# Patient Record
Sex: FEMALE | Race: BLACK OR AFRICAN AMERICAN | NOT HISPANIC OR LATINO | URBAN - METROPOLITAN AREA
[De-identification: names, ages, dates, MRNs, and addresses within clinical notes are randomized per-mention and may not be internally consistent; named-entity substitution may affect disease eponyms.]

---

## 2024-09-24 ASSESSMENT — KOOS JR
GOING UP OR DOWN STAIRS: SEVERE
KOOS JR RAW SCORE: 18
STRAIGHTENING KNEE FULLY: SEVERE
BENDING TO THE FLOOR TO PICK UP OBJECT: MODERATE
HOW SEVERE IS YOUR KNEE STIFFNESS AFTER FIRST WAKING IN MORNING: MODERATE
TWISING OR PIVOTING ON KNEE: SEVERE
RISING FROM SITTING: MODERATE
STANDING UPRIGHT: SEVERE

## 2024-09-30 ENCOUNTER — OUTPATIENT (OUTPATIENT)
Dept: OUTPATIENT SERVICES | Facility: HOSPITAL | Age: 59
LOS: 1 days | End: 2024-09-30
Payer: COMMERCIAL

## 2024-09-30 VITALS
HEART RATE: 88 BPM | OXYGEN SATURATION: 99 % | HEIGHT: 65 IN | RESPIRATION RATE: 18 BRPM | DIASTOLIC BLOOD PRESSURE: 86 MMHG | TEMPERATURE: 99 F | SYSTOLIC BLOOD PRESSURE: 136 MMHG | WEIGHT: 240.08 LBS

## 2024-09-30 DIAGNOSIS — Z96.659 PRESENCE OF UNSPECIFIED ARTIFICIAL KNEE JOINT: Chronic | ICD-10-CM

## 2024-09-30 DIAGNOSIS — Z98.890 OTHER SPECIFIED POSTPROCEDURAL STATES: Chronic | ICD-10-CM

## 2024-09-30 DIAGNOSIS — M17.12 UNILATERAL PRIMARY OSTEOARTHRITIS, LEFT KNEE: ICD-10-CM

## 2024-09-30 DIAGNOSIS — Z96.651 PRESENCE OF RIGHT ARTIFICIAL KNEE JOINT: Chronic | ICD-10-CM

## 2024-09-30 DIAGNOSIS — Z01.818 ENCOUNTER FOR OTHER PREPROCEDURAL EXAMINATION: ICD-10-CM

## 2024-09-30 DIAGNOSIS — M06.9 RHEUMATOID ARTHRITIS, UNSPECIFIED: ICD-10-CM

## 2024-09-30 LAB
A1C WITH ESTIMATED AVERAGE GLUCOSE RESULT: 5.9 % — HIGH (ref 4–5.6)
ANION GAP SERPL CALC-SCNC: 11 MMOL/L — SIGNIFICANT CHANGE UP (ref 5–17)
BUN SERPL-MCNC: 11 MG/DL — SIGNIFICANT CHANGE UP (ref 7–23)
CALCIUM SERPL-MCNC: 9.8 MG/DL — SIGNIFICANT CHANGE UP (ref 8.4–10.5)
CHLORIDE SERPL-SCNC: 101 MMOL/L — SIGNIFICANT CHANGE UP (ref 96–108)
CO2 SERPL-SCNC: 28 MMOL/L — SIGNIFICANT CHANGE UP (ref 22–31)
CREAT SERPL-MCNC: 0.59 MG/DL — SIGNIFICANT CHANGE UP (ref 0.5–1.3)
EGFR: 105 ML/MIN/1.73M2 — SIGNIFICANT CHANGE UP
ESTIMATED AVERAGE GLUCOSE: 123 MG/DL — HIGH (ref 68–114)
GLUCOSE SERPL-MCNC: 91 MG/DL — SIGNIFICANT CHANGE UP (ref 70–99)
HCT VFR BLD CALC: 37.6 % — SIGNIFICANT CHANGE UP (ref 34.5–45)
HGB BLD-MCNC: 12 G/DL — SIGNIFICANT CHANGE UP (ref 11.5–15.5)
MCHC RBC-ENTMCNC: 24.8 PG — LOW (ref 27–34)
MCHC RBC-ENTMCNC: 31.9 GM/DL — LOW (ref 32–36)
MCV RBC AUTO: 77.7 FL — LOW (ref 80–100)
MRSA PCR RESULT.: SIGNIFICANT CHANGE UP
NRBC # BLD: 0 /100 WBCS — SIGNIFICANT CHANGE UP (ref 0–0)
PLATELET # BLD AUTO: 261 K/UL — SIGNIFICANT CHANGE UP (ref 150–400)
POTASSIUM SERPL-MCNC: 3.6 MMOL/L — SIGNIFICANT CHANGE UP (ref 3.5–5.3)
POTASSIUM SERPL-SCNC: 3.6 MMOL/L — SIGNIFICANT CHANGE UP (ref 3.5–5.3)
RBC # BLD: 4.84 M/UL — SIGNIFICANT CHANGE UP (ref 3.8–5.2)
RBC # FLD: 14.7 % — HIGH (ref 10.3–14.5)
S AUREUS DNA NOSE QL NAA+PROBE: SIGNIFICANT CHANGE UP
SODIUM SERPL-SCNC: 140 MMOL/L — SIGNIFICANT CHANGE UP (ref 135–145)
WBC # BLD: 5.79 K/UL — SIGNIFICANT CHANGE UP (ref 3.8–10.5)
WBC # FLD AUTO: 5.79 K/UL — SIGNIFICANT CHANGE UP (ref 3.8–10.5)

## 2024-09-30 PROCEDURE — 80048 BASIC METABOLIC PNL TOTAL CA: CPT

## 2024-09-30 PROCEDURE — 87641 MR-STAPH DNA AMP PROBE: CPT

## 2024-09-30 PROCEDURE — G0463: CPT

## 2024-09-30 PROCEDURE — 85027 COMPLETE CBC AUTOMATED: CPT

## 2024-09-30 PROCEDURE — 87640 STAPH A DNA AMP PROBE: CPT

## 2024-09-30 PROCEDURE — 83036 HEMOGLOBIN GLYCOSYLATED A1C: CPT

## 2024-09-30 RX ORDER — SODIUM CHLORIDE 0.9 % (FLUSH) 0.9 %
3 SYRINGE (ML) INJECTION EVERY 8 HOURS
Refills: 0 | Status: DISCONTINUED | OUTPATIENT
Start: 2024-10-15 | End: 2024-10-15

## 2024-09-30 RX ORDER — CEFAZOLIN SODIUM 1 G
2000 VIAL (EA) INJECTION ONCE
Refills: 0 | Status: COMPLETED | OUTPATIENT
Start: 2024-10-15 | End: 2024-10-15

## 2024-09-30 RX ORDER — LIDOCAINE HCL 20 MG/ML
0.2 AMPUL (ML) INJECTION ONCE
Refills: 0 | Status: DISCONTINUED | OUTPATIENT
Start: 2024-10-15 | End: 2024-10-15

## 2024-09-30 RX ORDER — TRAMADOL HYDROCHLORIDE 50 MG/1
50 TABLET, COATED ORAL ONCE
Refills: 0 | Status: DISCONTINUED | OUTPATIENT
Start: 2024-10-15 | End: 2024-10-15

## 2024-09-30 RX ORDER — PANTOPRAZOLE SODIUM 40 MG/1
40 TABLET, DELAYED RELEASE ORAL ONCE
Refills: 0 | Status: COMPLETED | OUTPATIENT
Start: 2024-10-15 | End: 2024-10-15

## 2024-09-30 RX ORDER — CHLORHEXIDINE GLUCONATE ORAL RINSE 1.2 MG/ML
1 SOLUTION DENTAL ONCE
Refills: 0 | Status: COMPLETED | OUTPATIENT
Start: 2024-10-15 | End: 2024-10-15

## 2024-09-30 NOTE — H&P PST ADULT - NSICDXPASTMEDICALHX_GEN_ALL_CORE_FT
PAST MEDICAL HISTORY:  BMI 40.0-44.9, adult     Hypertension     Interstitial lung disease     Obesity     Osteoarthritis     Rheumatoid arthritis     Unilateral primary osteoarthritis, left knee

## 2024-09-30 NOTE — H&P PST ADULT - NSICDXFAMILYHX_GEN_ALL_CORE_FT
FAMILY HISTORY:  Father  Still living? No  Family history of lung cancer, Age at diagnosis: Age Unknown    Mother  Still living? No  Family history of CKD (chronic kidney disease), Age at diagnosis: Age Unknown

## 2024-09-30 NOTE — H&P PST ADULT - NSICDXPASTSURGICALHX_GEN_ALL_CORE_FT
PAST SURGICAL HISTORY:  History of arthroscopy of left knee     History of colonoscopy     History of myomectomy     S/P knee replacement partial 6/2015    Status post right partial knee replacement

## 2024-09-30 NOTE — H&P PST ADULT - HISTORY OF PRESENT ILLNESS
58 y/o F PMH HTN, obesity (BMI 40), interstial lung dx (followed by Pulm, last PFT's in chart), osteoarthritis (S/P partial right knee replacement 6/2015 and revision in 2016 and left knee arthroscopy in ~2008) and RA dx in 2021 on [facility]. Now presents with complaints of left knee pain. She is localizing the pain predominantly in the posterior aspect of the left knee. She tried a left knee cortisone on 5/24/24 with only temporary relief, her pain has returned. She has mild relief with rest and Aleve and has also tried a gel injection in the past. She is tried a left knee injection with not much relief and noted of swelling of the knee afterwards. She is having progressively worsening knee pain and would like to consider surgical intervention.  X-rays of the left knee 3 views obtained today 09/23/2024 shows severe bone-on-bone osteoarthritis joint destruction osteophyte formation most significant of the medial compartment, varus deformity and she now presents to PST for a scheduled Left Total Knee Arthroplasty joana/ NIYAH on 10/15/2024. Denies recent fevers, chills, cough, chest pain or SOB and feels well overall.

## 2024-09-30 NOTE — H&P PST ADULT - MUSCULOSKELETAL
details… left knee/normal/ROM intact/decreased ROM due to pain/normal gait/strength 5/5 bilateral upper extremities

## 2024-09-30 NOTE — H&P PST ADULT - ASSESSMENT
CAPRINI SCORE    AGE RELATED RISK FACTORS                                                             [ ] Age 41-60 years                                            (1 Point)  [ ] Age: 61-74 years                                           (2 Points)                 [ ] Age= 75 years                                                (3 Points)             DISEASE RELATED RISK FACTORS                                                       [ ] Edema in the lower extremities                 (1 Point)                     [ ] Varicose veins                                               (1 Point)                                 [ ] BMI > 25 Kg/m2                                            (1 Point)                                  [ ] Serious infection (ie PNA)                            (1 Point)                     [ ] Lung disease ( COPD, Emphysema)            (1 Point)                                                                          [ ] Acute myocardial infarction                         (1 Point)                  [ ] Congestive heart failure (in the previous month)  (1 Point)         [ ] Inflammatory bowel disease                            (1 Point)                  [ ] Central venous access, PICC or Port               (2 points)       (within the last month)                                                                [ ] Stroke (in the previous month)                        (5 Points)    [ ] Previous or present malignancy                       (2 points)                                                                                                                                                         HEMATOLOGY RELATED FACTORS                                                         [ ] Prior episodes of VTE                                     (3 Points)                     [ ] Positive family history for VTE                      (3 Points)                  [ ] Prothrombin 49329 A                                     (3 Points)                     [ ] Factor V Leiden                                                (3 Points)                        [ ] Lupus anticoagulants                                      (3 Points)                                                           [ ] Anticardiolipin antibodies                              (3 Points)                                                       [ ] High homocysteine in the blood                   (3 Points)                                             [ ] Other congenital or acquired thrombophilia      (3 Points)                                                [ ] Heparin induced thrombocytopenia                  (3 Points)                                        MOBILITY RELATED FACTORS  [ ] Bed rest                                                         (1 Point)  [ ] Plaster cast                                                    (2 points)  [ ] Bed bound for more than 72 hours           (2 Points)    GENDER SPECIFIC FACTORS  [ ] Pregnancy or had a baby within the last month   (1 Point)  [ ] Post-partum < 6 weeks                                   (1 Point)  [ ] Hormonal therapy  or oral contraception   (1 Point)  [ ] History of pregnancy complications              (1 point)  [ ] Unexplained or recurrent              (1 Point)    OTHER RISK FACTORS                                           (1 Point)  [ ] BMI >40, smoking, diabetes requiring insulin, chemotherapy  blood transfusions and length of surgery over 2 hours    SURGERY RELATED RISK FACTORS  [ ]  Section within the last month     (1 Point)  [ ] Minor surgery                                                  (1 Point)  [ ] Arthroscopic surgery                                       (2 Points)  [ ] Planned major surgery lasting more            (2 Points)      than 45 minutes     [ ] Elective hip or knee joint replacement       (5 points)       surgery                                                TRAUMA RELATED RISK FACTORS  [ ] Fracture of the hip, pelvis, or leg                       (5 Points)  [ ] Spinal cord injury resulting in paralysis             (5 points)       (in the previous month)    [ ] Paralysis  (less than 1 month)                             (5 Points)  [ ] Multiple Trauma within 1 month                        (5 Points)    Total Score [  7  ]    Caprini Score 0-2: Low Risk, NO VTE prophylaxis required for most patients, encourage ambulation  Caprini Score 3-6: Moderate Risk , pharmacologic VTE prophylaxis is indicated for most patients (in the absence of contraindications)  Caprini Score Greater than or =7: High risk, pharmocologic VTE prophylaxis indicated for most patients (in the absence of contraindications)    Denies loose/cracked/removable teeth  Mallampati II

## 2024-09-30 NOTE — H&P PST ADULT - PROBLEM SELECTOR PLAN 2
Scheduled for sx on 10/15/2024. ERP, Surgical and chlorhexidine instructions reviewed and provided to pt. Pt has not been authorized for CT yet.

## 2024-10-03 PROBLEM — Z96.652 STATUS POST TOTAL LEFT KNEE REPLACEMENT: Status: ACTIVE | Noted: 2024-10-03

## 2024-10-09 ENCOUNTER — TRANSCRIPTION ENCOUNTER (OUTPATIENT)
Age: 59
End: 2024-10-09

## 2024-10-09 PROBLEM — M17.12 UNILATERAL PRIMARY OSTEOARTHRITIS, LEFT KNEE: Chronic | Status: ACTIVE | Noted: 2024-09-30

## 2024-10-09 PROBLEM — I10 ESSENTIAL (PRIMARY) HYPERTENSION: Chronic | Status: ACTIVE | Noted: 2024-09-30

## 2024-10-09 PROBLEM — J84.9 INTERSTITIAL PULMONARY DISEASE, UNSPECIFIED: Chronic | Status: ACTIVE | Noted: 2024-09-30

## 2024-10-09 PROBLEM — M06.9 RHEUMATOID ARTHRITIS, UNSPECIFIED: Chronic | Status: ACTIVE | Noted: 2024-09-30

## 2024-10-10 ENCOUNTER — RESULT REVIEW (OUTPATIENT)
Age: 59
End: 2024-10-10

## 2024-10-10 ENCOUNTER — OUTPATIENT (OUTPATIENT)
Dept: OUTPATIENT SERVICES | Facility: HOSPITAL | Age: 59
LOS: 1 days | End: 2024-10-10
Payer: SELF-PAY

## 2024-10-10 ENCOUNTER — APPOINTMENT (OUTPATIENT)
Dept: CT IMAGING | Facility: CLINIC | Age: 59
End: 2024-10-10
Payer: SELF-PAY

## 2024-10-10 DIAGNOSIS — Z96.659 PRESENCE OF UNSPECIFIED ARTIFICIAL KNEE JOINT: Chronic | ICD-10-CM

## 2024-10-10 DIAGNOSIS — Z98.890 OTHER SPECIFIED POSTPROCEDURAL STATES: Chronic | ICD-10-CM

## 2024-10-10 DIAGNOSIS — Z96.651 PRESENCE OF RIGHT ARTIFICIAL KNEE JOINT: Chronic | ICD-10-CM

## 2024-10-10 DIAGNOSIS — Z00.8 ENCOUNTER FOR OTHER GENERAL EXAMINATION: ICD-10-CM

## 2024-10-10 PROCEDURE — 73700 CT LOWER EXTREMITY W/O DYE: CPT

## 2024-10-10 PROCEDURE — 73700 CT LOWER EXTREMITY W/O DYE: CPT | Mod: 26,LT

## 2024-10-11 ENCOUNTER — TRANSCRIPTION ENCOUNTER (OUTPATIENT)
Age: 59
End: 2024-10-11

## 2024-10-15 ENCOUNTER — TRANSCRIPTION ENCOUNTER (OUTPATIENT)
Age: 59
End: 2024-10-15

## 2024-10-15 ENCOUNTER — APPOINTMENT (OUTPATIENT)
Dept: ORTHOPEDIC SURGERY | Facility: HOSPITAL | Age: 59
End: 2024-10-15

## 2024-10-15 ENCOUNTER — OUTPATIENT (OUTPATIENT)
Dept: INPATIENT UNIT | Facility: HOSPITAL | Age: 59
LOS: 1 days | End: 2024-10-15
Payer: COMMERCIAL

## 2024-10-15 VITALS
HEART RATE: 88 BPM | SYSTOLIC BLOOD PRESSURE: 135 MMHG | RESPIRATION RATE: 16 BRPM | TEMPERATURE: 98 F | DIASTOLIC BLOOD PRESSURE: 83 MMHG | HEIGHT: 65 IN | WEIGHT: 240.08 LBS | OXYGEN SATURATION: 97 %

## 2024-10-15 DIAGNOSIS — Z98.890 OTHER SPECIFIED POSTPROCEDURAL STATES: Chronic | ICD-10-CM

## 2024-10-15 DIAGNOSIS — Z96.659 PRESENCE OF UNSPECIFIED ARTIFICIAL KNEE JOINT: Chronic | ICD-10-CM

## 2024-10-15 DIAGNOSIS — Z96.651 PRESENCE OF RIGHT ARTIFICIAL KNEE JOINT: Chronic | ICD-10-CM

## 2024-10-15 DIAGNOSIS — M17.12 UNILATERAL PRIMARY OSTEOARTHRITIS, LEFT KNEE: ICD-10-CM

## 2024-10-15 LAB — GLUCOSE BLDC GLUCOMTR-MCNC: 95 MG/DL — SIGNIFICANT CHANGE UP (ref 70–99)

## 2024-10-15 PROCEDURE — 73560 X-RAY EXAM OF KNEE 1 OR 2: CPT | Mod: 26,LT

## 2024-10-15 DEVICE — COMP FEM CR CMNTLSS BEADED W/ PA SZ 2 LT: Type: IMPLANTABLE DEVICE | Site: LEFT | Status: FUNCTIONAL

## 2024-10-15 DEVICE — MAKO BONE PIN 3.2MM X 110MM: Type: IMPLANTABLE DEVICE | Site: LEFT | Status: FUNCTIONAL

## 2024-10-15 DEVICE — INSERT TIB BEARING CS X3 SZ 3 11MM: Type: IMPLANTABLE DEVICE | Site: LEFT | Status: FUNCTIONAL

## 2024-10-15 DEVICE — MAKO BONE PIN 3.2MM X 140MM: Type: IMPLANTABLE DEVICE | Site: LEFT | Status: FUNCTIONAL

## 2024-10-15 DEVICE — TRIATHLON TIBIAL COMP SZ 3: Type: IMPLANTABLE DEVICE | Site: LEFT | Status: FUNCTIONAL

## 2024-10-15 RX ORDER — SENNOSIDES 8.6 MG
2 TABLET ORAL AT BEDTIME
Refills: 0 | Status: DISCONTINUED | OUTPATIENT
Start: 2024-10-15 | End: 2024-10-29

## 2024-10-15 RX ORDER — CEFAZOLIN SODIUM 1 G
2000 VIAL (EA) INJECTION EVERY 8 HOURS
Refills: 0 | Status: COMPLETED | OUTPATIENT
Start: 2024-10-15 | End: 2024-10-16

## 2024-10-15 RX ORDER — CELECOXIB 200 MG/1
200 CAPSULE ORAL EVERY 12 HOURS
Refills: 0 | Status: DISCONTINUED | OUTPATIENT
Start: 2024-10-16 | End: 2024-10-29

## 2024-10-15 RX ORDER — FENTANYL CITRATE-0.9 % NACL/PF 300MCG/30
25 PATIENT CONTROLLED ANALGESIA VIAL INJECTION
Refills: 0 | Status: DISCONTINUED | OUTPATIENT
Start: 2024-10-15 | End: 2024-10-15

## 2024-10-15 RX ORDER — ONDANSETRON HCL/PF 4 MG/2 ML
4 VIAL (ML) INJECTION EVERY 6 HOURS
Refills: 0 | Status: DISCONTINUED | OUTPATIENT
Start: 2024-10-15 | End: 2024-10-29

## 2024-10-15 RX ORDER — FOLIC ACID 1 MG/1
1 TABLET ORAL DAILY
Refills: 0 | Status: DISCONTINUED | OUTPATIENT
Start: 2024-10-15 | End: 2024-10-29

## 2024-10-15 RX ORDER — OXYCODONE HYDROCHLORIDE 30 MG/1
5 TABLET, FILM COATED, EXTENDED RELEASE ORAL EVERY 4 HOURS
Refills: 0 | Status: DISCONTINUED | OUTPATIENT
Start: 2024-10-15 | End: 2024-10-16

## 2024-10-15 RX ORDER — ASPIRIN 325 MG
325 TABLET ORAL
Refills: 0 | Status: DISCONTINUED | OUTPATIENT
Start: 2024-10-15 | End: 2024-10-29

## 2024-10-15 RX ORDER — CETIRIZINE HYDROCHLORIDE 10 MG/1
10 TABLET ORAL DAILY
Refills: 0 | Status: DISCONTINUED | OUTPATIENT
Start: 2024-10-15 | End: 2024-10-29

## 2024-10-15 RX ORDER — PANTOPRAZOLE SODIUM 40 MG/1
40 TABLET, DELAYED RELEASE ORAL
Refills: 0 | Status: DISCONTINUED | OUTPATIENT
Start: 2024-10-15 | End: 2024-10-29

## 2024-10-15 RX ORDER — HYDROCHLOROTHIAZIDE 50 MG/1
1 TABLET ORAL
Refills: 0 | DISCHARGE

## 2024-10-15 RX ORDER — SODIUM CHLORIDE 0.9 % (FLUSH) 0.9 %
500 SYRINGE (ML) INJECTION ONCE
Refills: 0 | Status: COMPLETED | OUTPATIENT
Start: 2024-10-15 | End: 2024-10-15

## 2024-10-15 RX ORDER — ACETAMINOPHEN 325 MG
1000 TABLET ORAL ONCE
Refills: 0 | Status: DISCONTINUED | OUTPATIENT
Start: 2024-10-16 | End: 2024-10-29

## 2024-10-15 RX ORDER — SODIUM CHLORIDE 0.9 % (FLUSH) 0.9 %
500 SYRINGE (ML) INJECTION ONCE
Refills: 0 | Status: COMPLETED | OUTPATIENT
Start: 2024-10-15 | End: 2024-10-16

## 2024-10-15 RX ORDER — FOLIC ACID 1 MG/1
1 TABLET ORAL
Refills: 0 | DISCHARGE

## 2024-10-15 RX ORDER — ACETAMINOPHEN 325 MG
1000 TABLET ORAL ONCE
Refills: 0 | Status: COMPLETED | OUTPATIENT
Start: 2024-10-15 | End: 2024-10-15

## 2024-10-15 RX ORDER — OXYCODONE HYDROCHLORIDE 30 MG/1
10 TABLET, FILM COATED, EXTENDED RELEASE ORAL EVERY 4 HOURS
Refills: 0 | Status: DISCONTINUED | OUTPATIENT
Start: 2024-10-15 | End: 2024-10-15

## 2024-10-15 RX ORDER — ONDANSETRON HCL/PF 4 MG/2 ML
4 VIAL (ML) INJECTION ONCE
Refills: 0 | Status: DISCONTINUED | OUTPATIENT
Start: 2024-10-15 | End: 2024-10-15

## 2024-10-15 RX ORDER — KETOROLAC TROMETHAMINE 10 MG/1
15 TABLET, FILM COATED ORAL EVERY 6 HOURS
Refills: 0 | Status: COMPLETED | OUTPATIENT
Start: 2024-10-15 | End: 2024-10-16

## 2024-10-15 RX ORDER — ADALIMUMAB 40MG/0.8ML
40 KIT SUBCUTANEOUS
Refills: 0 | DISCHARGE

## 2024-10-15 RX ORDER — TRAMADOL HYDROCHLORIDE 50 MG/1
50 TABLET, COATED ORAL EVERY 6 HOURS
Refills: 0 | Status: DISCONTINUED | OUTPATIENT
Start: 2024-10-15 | End: 2024-10-15

## 2024-10-15 RX ORDER — ACETAMINOPHEN 325 MG
975 TABLET ORAL EVERY 8 HOURS
Refills: 0 | Status: DISCONTINUED | OUTPATIENT
Start: 2024-10-16 | End: 2024-10-29

## 2024-10-15 RX ORDER — MAGNESIUM HYDROXIDE 400 MG/5ML
30 SUSPENSION, ORAL (FINAL DOSE FORM) ORAL DAILY
Refills: 0 | Status: DISCONTINUED | OUTPATIENT
Start: 2024-10-15 | End: 2024-10-29

## 2024-10-15 RX ORDER — ACETAMINOPHEN 325 MG
1000 TABLET ORAL ONCE
Refills: 0 | Status: COMPLETED | OUTPATIENT
Start: 2024-10-16 | End: 2024-10-16

## 2024-10-15 RX ADMIN — OXYCODONE HYDROCHLORIDE 5 MILLIGRAM(S): 30 TABLET, FILM COATED, EXTENDED RELEASE ORAL at 19:21

## 2024-10-15 RX ADMIN — Medication 17 GRAM(S): at 20:21

## 2024-10-15 RX ADMIN — Medication 2 TABLET(S): at 20:21

## 2024-10-15 RX ADMIN — OXYCODONE HYDROCHLORIDE 5 MILLIGRAM(S): 30 TABLET, FILM COATED, EXTENDED RELEASE ORAL at 20:21

## 2024-10-15 RX ADMIN — PANTOPRAZOLE SODIUM 40 MILLIGRAM(S): 40 TABLET, DELAYED RELEASE ORAL at 12:16

## 2024-10-15 RX ADMIN — TRAMADOL HYDROCHLORIDE 50 MILLIGRAM(S): 50 TABLET, COATED ORAL at 12:17

## 2024-10-15 RX ADMIN — Medication 500 MILLILITER(S): at 16:00

## 2024-10-15 RX ADMIN — Medication 500 MILLILITER(S): at 19:21

## 2024-10-15 RX ADMIN — Medication 400 MILLIGRAM(S): at 22:07

## 2024-10-15 RX ADMIN — Medication 325 MILLIGRAM(S): at 18:07

## 2024-10-15 RX ADMIN — CHLORHEXIDINE GLUCONATE ORAL RINSE 1 APPLICATION(S): 1.2 SOLUTION DENTAL at 12:18

## 2024-10-15 RX ADMIN — KETOROLAC TROMETHAMINE 15 MILLIGRAM(S): 10 TABLET, FILM COATED ORAL at 21:00

## 2024-10-15 RX ADMIN — KETOROLAC TROMETHAMINE 15 MILLIGRAM(S): 10 TABLET, FILM COATED ORAL at 20:22

## 2024-10-15 RX ADMIN — Medication 100 MILLIGRAM(S): at 20:22

## 2024-10-15 RX ADMIN — Medication 1000 MILLIGRAM(S): at 23:07

## 2024-10-15 NOTE — PRE-ANESTHESIA EVALUATION ADULT - NSANTHPMHFT_GEN_ALL_CORE
58 y/o F PMH HTN, obesity (BMI 40), interstial lung dx (followed by Pulm, last PFT's in chart), osteoarthritis (S/P partial right knee replacement 6/2015 and revision in 2016 and left knee arthroscopy in ~2008) and RA dx in 2021 on [facility].

## 2024-10-15 NOTE — BRIEF OPERATIVE NOTE - NSICDXBRIEFPOSTOP_GEN_ALL_CORE_FT
POST-OP DIAGNOSIS:  Osteoarthritis of left knee 15-Oct-2024 15:22:12  Keri Pierson  Unilateral primary osteoarthritis, left knee 15-Oct-2024 15:22:21  Keri Pierson

## 2024-10-15 NOTE — OCCUPATIONAL THERAPY INITIAL EVALUATION ADULT - PERTINENT HX OF CURRENT PROBLEM, REHAB EVAL
58 y/o F PMH HTN, obesity (BMI 40), interstial lung dx (followed by Pulm, last PFT's in chart), osteoarthritis (S/P partial right knee replacement 6/2015 and revision in 2016 and left knee arthroscopy in ~2008) and RA dx in 2021 on [facility]. Now presents with complaints of left knee pain. She is localizing the pain predominantly in the posterior aspect of the left knee. She tried a left knee cortisone on 5/24/24 with only temporary relief, her pain has returned. She has mild relief with rest and Aleve and has also tried a gel injection in the past. She is tried a left knee injection with not much relief and noted of swelling of the knee afterwards. She is having progressively worsening knee pain and would like to consider surgical intervention.  X-rays of the left knee 3 views obtained today 09/23/2024 shows severe bone-on-bone osteoarthritis joint destruction osteophyte formation most significant of the medial compartment, varus deformity and she now s/p Left Total Knee Arthroplasty w/ NIYAH on 10/15/2024.

## 2024-10-15 NOTE — BRIEF OPERATIVE NOTE - NSICDXBRIEFPREOP_GEN_ALL_CORE_FT
PRE-OP DIAGNOSIS:  Unilateral primary osteoarthritis, left knee 15-Oct-2024 15:22:27  Keri Pierson  Osteoarthritis of left knee 15-Oct-2024 15:22:03  Keri Pierson

## 2024-10-15 NOTE — PHYSICAL THERAPY INITIAL EVALUATION ADULT - ADDITIONAL COMMENTS
Pt reports that she lives in a pvt house with her apt with her  and dtr with no steps to negotiate. Pt states that she was independent with all ADLs and mobility with no AD prior to admission. Pt owns a rolling walker and straight cane for DME. Pt owns a tub shower with grab bars.

## 2024-10-15 NOTE — DISCHARGE NOTE PROVIDER - HOSPITAL COURSE
Reason for Admission	Left total knee Arthroplasty  Goal of care "Better quality of life and to move around"      This is a 59 year old female admitted to [facility] on 10/15/24 for an elective total knee arthroplasty.  Surgery was uncomplicated.  Patient evaluated and treated by PT, recommended for home.  Remain of hospital stay unremarkable, and patient discharged home when PT cleared. History of Present Illness	  58 y/o F PMH HTN, obesity (BMI 40), intersitial lung dx (followed by Pulm, last PFT's in chart), osteoarthritis (S/P partial right knee replacement 6/2015 and revision in 2016 and left knee arthroscopy in ~2008) and RA dx in 2021 on [facility]. Now presents with complaints of left knee pain. She is localizing the pain predominantly in the posterior aspect of the left knee. She tried a left knee cortisone on 5/24/24 with only temporary relief, her pain has returned. She has mild relief with rest and Aleve and has also tried a gel injection in the past. She is tried a left knee injection with not much relief and noted of swelling of the knee afterwards. She is having progressively worsening knee pain and would like to consider surgical intervention.  X-rays of the left knee 3 views obtained today 09/23/2024 shows severe bone-on-bone osteoarthritis joint destruction osteophyte formation most significant of the medial compartment, varus deformity and she now presents to PST for a scheduled Left Total Knee Arthroplasty joana/ NIYAH on 10/15/2024. Denies recent fevers, chills, cough, chest pain or SOB and feels well overall.     Allergies:  	No Known Allergies:       PAST MEDICAL HISTORY:  -BMI 40.0-44.9, adult   -Hypertension   -Interstitial lung disease   -Obesity   -Osteoarthritis   -Rheumatoid arthritis   -Unilateral primary osteoarthritis, left knee.     PAST SURGICAL HISTORY:  -History of arthroscopy of left knee   -History of colonoscopy   -History of myomectomy   -S/P knee replacement partial 6/2015  -Status post right partial knee replacement.    10/15/24:  Patient presents to the hospital for elective surgery, underwent a left total knee replacement-  -tolerated procedure without complications.  Patient was evaluated by Physical therapy whom recommended home with home PT for discharge plan.  Patient is stable for discharge when cleared by PT.                 This is a 59 year old female admitted to [facility] on 10/15/24 for an elective total knee arthroplasty.    Surgery was uncomplicated.  Patient evaluated and treated by PT, recommended for home.    Remain of hospital stay unremarkable, and patient discharged home when PT cleared.

## 2024-10-15 NOTE — DISCHARGE NOTE PROVIDER - NSDCMRMEDTOKEN_GEN_ALL_CORE_FT
folic acid: 1 milligram(s) orally once a day  Humira 40 mg/0.8 mL subcutaneous kit: 40 milligram(s) subcutaneously every other week on Saturday  hydroCHLOROthiazide 25 mg oral tablet: 1 tab(s) orally once a day  methotrexate 10 mg oral tablet: 2 tab(s) orally once a week on Thursday  ZyrTEC 10 mg oral tablet: 1 tab(s) orally once a day as needed for  allergy symptoms   acetaminophen 325 mg oral tablet: 3 tab(s) orally every 8 hours Continue for 7 days, then as needed  aspirin 325 mg oral delayed release tablet: 1 tab(s) orally 2 times a day  folic acid: 1 milligram(s) orally once a day  Humira 40 mg/0.8 mL subcutaneous kit: 40 milligram(s) subcutaneously every other week on Saturday  hydroCHLOROthiazide 25 mg oral tablet: 1 tab(s) orally once a day  methotrexate 10 mg oral tablet: 2 tab(s) orally once a week on Thursday. Please hold until discussed with surgeon  naproxen 500 mg oral tablet: 1 tab(s) orally 2 times a day Continue for 14 days then as needed  Narcan 4 mg/0.1 mL nasal spray: 1 spray(s) intranasally every 2 minutes Alternate nostrils  oxyCODONE 5 mg oral tablet: 1 tab(s) orally every 4 hours as needed for Moderate Pain (4 - 6) Or 2 tabs orally every 4 hours as needed for severe pain (7-10)  polyethylene glycol 3350 oral powder for reconstitution: 17 gram(s) orally once a day (at bedtime)  senna leaf extract oral tablet: 2 tab(s) orally once a day (at bedtime)  traMADol 50 mg oral tablet: 1 tab(s) orally every 6 hours As needed Mild Pain (1 - 3)  ZyrTEC 10 mg oral tablet: 1 tab(s) orally once a day as needed for  allergy symptoms   acetaminophen 325 mg oral tablet: 3 tab(s) orally every 8 hours Continue for 7 days, then as needed  aspirin 325 mg oral delayed release tablet: 1 tab(s) orally 2 times a day MDD: 2  folic acid: 1 milligram(s) orally once a day  Humira 40 mg/0.8 mL subcutaneous kit: 40 milligram(s) subcutaneously every other week on Saturday  hydroCHLOROthiazide 25 mg oral tablet: 1 tab(s) orally once a day  methotrexate 10 mg oral tablet: 2 tab(s) orally once a week on Thursday. Please hold until discussed with surgeon  naproxen 500 mg oral tablet: 1 tab(s) orally 2 times a day Continue for 14 days then stop MDD: 2  Narcan 4 mg/0.1 mL nasal spray: 1 spray(s) intranasally every 2 minutes Alternate nostrils  oxyCODONE 5 mg oral tablet: 1 tab(s) orally every 4 hours as needed for Moderate Pain (4 - 6) Or 2 tabs orally every 4 hours as needed for severe pain (7-10) MDD: 6  pantoprazole 40 mg oral delayed release tablet: 1 tab(s) orally once a day (before a meal) MDD: 1  polyethylene glycol 3350 oral powder for reconstitution: 17 gram(s) orally once a day (at bedtime)  senna leaf extract oral tablet: 2 tab(s) orally once a day (at bedtime)  traMADol 50 mg oral tablet: 1 tab(s) orally every 6 hours as needed for Mild Pain (1 - 3) MDD: 4  ZyrTEC 10 mg oral tablet: 1 tab(s) orally once a day as needed for  allergy symptoms

## 2024-10-15 NOTE — CHART NOTE - NSCHARTNOTEFT_GEN_A_CORE
Resting without complaints.  No Chest Pain, SOB, N/V.    T(C): 36.3 (10-15-24 @ 15:30), Max: 36.8 (10-15-24 @ 09:56)  HR: 80 (10-15-24 @ 16:15) (80 - 93)  BP: 123/67 (10-15-24 @ 16:15) (117/71 - 135/83)  RR: 16 (10-15-24 @ 16:15) (15 - 16)  SpO2: 99% (10-15-24 @ 16:15) (97% - 100%)  Wt(kg): --    Exam:  Alert and [place], No Acute Distress  LLE dsg c/d/i with soft/compressible compartments  Calves soft, non-tender bilaterally  patient can move legs grossly but limited 2/2 spinal block  SILT to ankle  + DP pulse    Xray:-intact L knee hardware     AM labs        A/P: 59yFemale S/p  LTKA.  Exam limited 2/2 spinal block.  VSS. NAD  -PT/OT-WBAT  -Re-eval by ortho team when block d/c'd  -IS  -DVT PPx  -Pain Control  -Continue Current Tx  -AM labs    Grabiel Ventura PA-C  Team Pager #[phone]

## 2024-10-15 NOTE — PHYSICAL THERAPY INITIAL EVALUATION ADULT - PERTINENT HX OF CURRENT PROBLEM, REHAB EVAL
58 y/o F PMH HTN, obesity (BMI 40), interstial lung dx (followed by Pulm, last PFT's in chart), osteoarthritis (S/P partial right knee replacement 6/2015 and revision in 2016 and left knee arthroscopy in ~2008) and RA dx in 2021 on [facility]. Now presents with complaints of left knee pain. She is localizing the pain predominantly in the posterior aspect of the left knee. She tried a left knee cortisone on 5/24/24 with only temporary relief, her pain has returned. She has mild relief with rest and Aleve and has also tried a gel injection in the past. She is tried a left knee injection with not much relief and noted of swelling of the knee afterwards. She is having progressively worsening knee pain and would like to consider surgical intervention.  X-rays of the left knee 3 views obtained today 09/23/2024 shows severe bone-on-bone osteoarthritis joint destruction osteophyte formation most significant of the medial compartment, varus deformity and she now presents to PST for a scheduled Left Total Knee Arthroplasty joana/ NIYAH on 10/15/2024. Denies recent fevers, chills, cough, chest pain or SOB and feels well overall. 60 y/o F PMH HTN, obesity (BMI 40), interstitial lung dx (followed by Pulm, last PFT's in chart), osteoarthritis (S/P partial right knee replacement 6/2015 and revision in 2016 and left knee arthroscopy in ~2008) and RA dx in 2021 on [facility]. Now presents with complaints of left knee pain. She is localizing the pain predominantly in the posterior aspect of the left knee. She tried a left knee cortisone on 5/24/24 with only temporary relief, her pain has returned. She has mild relief with rest and Aleve and has also tried a gel injection in the past. She is tried a left knee injection with not much relief and noted of swelling of the knee afterwards. She is having progressively worsening knee pain and would like to consider surgical intervention.  X-rays of the left knee 3 views obtained today 09/23/2024 shows severe bone-on-bone osteoarthritis joint destruction osteophyte formation most significant of the medial compartment, varus deformity and she now presents to PST for a scheduled Left Total Knee Arthroplasty joana/ NIYAH on 10/15/2024. Denies recent fevers, chills, cough, chest pain or SOB and feels well overall.

## 2024-10-15 NOTE — DISCHARGE NOTE PROVIDER - NSDCFUSCHEDAPPT_GEN_ALL_CORE_FT
Lucio Jensen  [facility]  ORTHOSURG [address]  Scheduled Appointment: 10/15/2024

## 2024-10-15 NOTE — DISCHARGE NOTE PROVIDER - CARE PROVIDER_API CALL
Lucio Jensen  Orthopaedic Surgery  [address], [address]  [place] [address]  Phone: [phone]  Fax: [phone]  Follow Up Time:

## 2024-10-15 NOTE — PHYSICAL THERAPY INITIAL EVALUATION ADULT - LEVEL OF INDEPENDENCE: SIT/STAND, REHAB EVAL
contact guard
PAST SURGICAL HISTORY:  H/O ileostomy 4/2015    Parastomal hernia without obstruction or gangrene

## 2024-10-15 NOTE — PRE-ANESTHESIA EVALUATION ADULT - TEMPERATURE IN CELSIUS (DEGREES C)
Education provided on the pain management plan of care/Side effects of pain management treatment/Activities of daily living, including home environment that might     exacerbate pain or reduce effectiveness of the pain management plan of care as well as strategies to address these issues/Safe use, storage and disposal of opioids when prescribed 36.8

## 2024-10-15 NOTE — PATIENT PROFILE ADULT - FALL HARM RISK - UNIVERSAL INTERVENTIONS
Bed in lowest position, wheels locked, appropriate side rails in place/Call bell, personal items and telephone in reach/Instruct patient to call for assistance before getting out of bed or chair/Non-slip footwear when patient is out of bed/[place] to call system/Physically safe environment - no spills, clutter or unnecessary equipment/Purposeful Proactive Rounding/Room/bathroom lighting operational, light cord in reach

## 2024-10-15 NOTE — OCCUPATIONAL THERAPY INITIAL EVALUATION ADULT - ASSISTIVE DEVICE FOR TOILET TRANSFER, REHAB EVAL
Quality 111:Pneumonia Vaccination Status For Older Adults: Pneumococcal Vaccination Previously Received Quality 110: Preventive Care And Screening: Influenza Immunization: Influenza Immunization Administered during Influenza season Detail Level: Detailed Quality 226: Preventive Care And Screening: Tobacco Use: Screening And Cessation Intervention: Patient screened for tobacco use and is an ex/non-smoker rolling walker

## 2024-10-15 NOTE — BRIEF OPERATIVE NOTE - BRIEF OP NOTE DRAINS
[Time Spent: ___ minutes] : I have spent [unfilled] minutes of time on the encounter. [FreeTextEntry3] : \par I, NATALEE AGUILARU , am scribing for and in the presence of DALILA JAY the following sections: History of present illness, past Medical/family/surgical/family/social history, review of systems, vital signs, physical exam and disposition.\par \par I personally performed the services described in the documentation, reviewed the documentation recorded by the scribe in my presence and it accurately and completely records my words and actions.\par \par \par  None

## 2024-10-16 ENCOUNTER — TRANSCRIPTION ENCOUNTER (OUTPATIENT)
Age: 59
End: 2024-10-16

## 2024-10-16 VITALS
TEMPERATURE: 98 F | RESPIRATION RATE: 18 BRPM | HEART RATE: 95 BPM | DIASTOLIC BLOOD PRESSURE: 80 MMHG | SYSTOLIC BLOOD PRESSURE: 144 MMHG | OXYGEN SATURATION: 97 %

## 2024-10-16 LAB
ANION GAP SERPL CALC-SCNC: 13 MMOL/L — SIGNIFICANT CHANGE UP (ref 5–17)
BUN SERPL-MCNC: 9 MG/DL — SIGNIFICANT CHANGE UP (ref 7–23)
CALCIUM SERPL-MCNC: 9.1 MG/DL — SIGNIFICANT CHANGE UP (ref 8.4–10.5)
CHLORIDE SERPL-SCNC: 100 MMOL/L — SIGNIFICANT CHANGE UP (ref 96–108)
CO2 SERPL-SCNC: 24 MMOL/L — SIGNIFICANT CHANGE UP (ref 22–31)
CREAT SERPL-MCNC: 0.59 MG/DL — SIGNIFICANT CHANGE UP (ref 0.5–1.3)
EGFR: 105 ML/MIN/1.73M2 — SIGNIFICANT CHANGE UP
GLUCOSE SERPL-MCNC: 117 MG/DL — HIGH (ref 70–99)
HCT VFR BLD CALC: 34.1 % — LOW (ref 34.5–45)
HGB BLD-MCNC: 11.1 G/DL — LOW (ref 11.5–15.5)
MCHC RBC-ENTMCNC: 25.2 PG — LOW (ref 27–34)
MCHC RBC-ENTMCNC: 32.6 GM/DL — SIGNIFICANT CHANGE UP (ref 32–36)
MCV RBC AUTO: 77.3 FL — LOW (ref 80–100)
NRBC # BLD: 0 /100 WBCS — SIGNIFICANT CHANGE UP (ref 0–0)
PLATELET # BLD AUTO: 258 K/UL — SIGNIFICANT CHANGE UP (ref 150–400)
POTASSIUM SERPL-MCNC: 3.6 MMOL/L — SIGNIFICANT CHANGE UP (ref 3.5–5.3)
POTASSIUM SERPL-SCNC: 3.6 MMOL/L — SIGNIFICANT CHANGE UP (ref 3.5–5.3)
RBC # BLD: 4.41 M/UL — SIGNIFICANT CHANGE UP (ref 3.8–5.2)
RBC # FLD: 14.5 % — SIGNIFICANT CHANGE UP (ref 10.3–14.5)
SODIUM SERPL-SCNC: 137 MMOL/L — SIGNIFICANT CHANGE UP (ref 135–145)
WBC # BLD: 8.44 K/UL — SIGNIFICANT CHANGE UP (ref 3.8–10.5)
WBC # FLD AUTO: 8.44 K/UL — SIGNIFICANT CHANGE UP (ref 3.8–10.5)

## 2024-10-16 PROCEDURE — 97530 THERAPEUTIC ACTIVITIES: CPT

## 2024-10-16 PROCEDURE — C1713: CPT

## 2024-10-16 PROCEDURE — S2900: CPT

## 2024-10-16 PROCEDURE — 73560 X-RAY EXAM OF KNEE 1 OR 2: CPT

## 2024-10-16 PROCEDURE — 82962 GLUCOSE BLOOD TEST: CPT

## 2024-10-16 PROCEDURE — 97116 GAIT TRAINING THERAPY: CPT

## 2024-10-16 PROCEDURE — C1776: CPT

## 2024-10-16 PROCEDURE — 97161 PT EVAL LOW COMPLEX 20 MIN: CPT

## 2024-10-16 PROCEDURE — 97165 OT EVAL LOW COMPLEX 30 MIN: CPT

## 2024-10-16 PROCEDURE — C9399: CPT

## 2024-10-16 PROCEDURE — 27447 TOTAL KNEE ARTHROPLASTY: CPT | Mod: LT

## 2024-10-16 RX ORDER — METHOTREXATE 25 MG/.5ML
2 INJECTION, SOLUTION SUBCUTANEOUS
Qty: 0 | Refills: 0 | DISCHARGE

## 2024-10-16 RX ORDER — OXYCODONE HYDROCHLORIDE 30 MG/1
1 TABLET, FILM COATED, EXTENDED RELEASE ORAL
Qty: 40 | Refills: 0
Start: 2024-10-16

## 2024-10-16 RX ORDER — TRAMADOL HYDROCHLORIDE 50 MG/1
1 TABLET, COATED ORAL
Qty: 0 | Refills: 0 | DISCHARGE
Start: 2024-10-16

## 2024-10-16 RX ORDER — ASPIRIN 325 MG
1 TABLET ORAL
Qty: 0 | Refills: 0 | DISCHARGE
Start: 2024-10-16

## 2024-10-16 RX ORDER — SENNOSIDES 8.6 MG
2 TABLET ORAL
Qty: 0 | Refills: 0 | DISCHARGE
Start: 2024-10-16

## 2024-10-16 RX ORDER — ASPIRIN 325 MG
1 TABLET ORAL
Qty: 84 | Refills: 0
Start: 2024-10-16 | End: 2024-11-26

## 2024-10-16 RX ORDER — ACETAMINOPHEN 325 MG
3 TABLET ORAL
Qty: 0 | Refills: 0 | DISCHARGE
Start: 2024-10-16

## 2024-10-16 RX ORDER — PANTOPRAZOLE SODIUM 40 MG/1
1 TABLET, DELAYED RELEASE ORAL
Refills: 0
Start: 2024-10-16

## 2024-10-16 RX ORDER — OXYCODONE HYDROCHLORIDE 30 MG/1
1 TABLET, FILM COATED, EXTENDED RELEASE ORAL
Qty: 0 | Refills: 0 | DISCHARGE
Start: 2024-10-16

## 2024-10-16 RX ORDER — TRAMADOL HYDROCHLORIDE 50 MG/1
1 TABLET, COATED ORAL
Qty: 28 | Refills: 0
Start: 2024-10-16 | End: 2024-10-22

## 2024-10-16 RX ORDER — NALOXONE HYDROCHLORIDE 0.4 MG/ML
1 INJECTION, SOLUTION INTRAMUSCULAR; INTRAVENOUS; SUBCUTANEOUS
Qty: 0 | Refills: 0 | DISCHARGE

## 2024-10-16 RX ORDER — PANTOPRAZOLE SODIUM 40 MG/1
1 TABLET, DELAYED RELEASE ORAL
Qty: 42 | Refills: 0
Start: 2024-10-16 | End: 2024-11-26

## 2024-10-16 RX ORDER — NALOXONE HYDROCHLORIDE 0.4 MG/ML
1 INJECTION, SOLUTION INTRAMUSCULAR; INTRAVENOUS; SUBCUTANEOUS
Qty: 1 | Refills: 0
Start: 2024-10-16

## 2024-10-16 RX ADMIN — KETOROLAC TROMETHAMINE 15 MILLIGRAM(S): 10 TABLET, FILM COATED ORAL at 02:57

## 2024-10-16 RX ADMIN — KETOROLAC TROMETHAMINE 15 MILLIGRAM(S): 10 TABLET, FILM COATED ORAL at 03:45

## 2024-10-16 RX ADMIN — OXYCODONE HYDROCHLORIDE 5 MILLIGRAM(S): 30 TABLET, FILM COATED, EXTENDED RELEASE ORAL at 13:19

## 2024-10-16 RX ADMIN — Medication 500 MILLILITER(S): at 05:58

## 2024-10-16 RX ADMIN — PANTOPRAZOLE SODIUM 40 MILLIGRAM(S): 40 TABLET, DELAYED RELEASE ORAL at 05:10

## 2024-10-16 RX ADMIN — KETOROLAC TROMETHAMINE 15 MILLIGRAM(S): 10 TABLET, FILM COATED ORAL at 09:32

## 2024-10-16 RX ADMIN — Medication 100 MILLIGRAM(S): at 05:11

## 2024-10-16 RX ADMIN — Medication 101.6 MILLIGRAM(S): at 05:58

## 2024-10-16 RX ADMIN — OXYCODONE HYDROCHLORIDE 5 MILLIGRAM(S): 30 TABLET, FILM COATED, EXTENDED RELEASE ORAL at 05:10

## 2024-10-16 RX ADMIN — OXYCODONE HYDROCHLORIDE 5 MILLIGRAM(S): 30 TABLET, FILM COATED, EXTENDED RELEASE ORAL at 05:50

## 2024-10-16 RX ADMIN — Medication 400 MILLIGRAM(S): at 06:56

## 2024-10-16 RX ADMIN — Medication 1000 MILLIGRAM(S): at 07:30

## 2024-10-16 RX ADMIN — KETOROLAC TROMETHAMINE 15 MILLIGRAM(S): 10 TABLET, FILM COATED ORAL at 08:32

## 2024-10-16 RX ADMIN — Medication 325 MILLIGRAM(S): at 05:10

## 2024-10-16 NOTE — PROGRESS NOTE ADULT - ASSESSMENT
A/P: 58 y/o female s/p left total knee arthroplasty, POD # 1    - Pain control/analgesia  - DVT ppx: Aspirin, SCDs  - PT/OT   - WBAT  - OOB  - Incentive spirometer  - GI ppx  - Bowel regimen  - Follow up AM labs  - Notify ortho for questions  - Dispo: PT recommended home with home PT    Phyllis Hong PA-C  Orthopedic Surgery Team  Team Pager #[phone]

## 2024-10-16 NOTE — DISCHARGE NOTE NURSING/CASE MANAGEMENT/SOCIAL WORK - FINANCIAL ASSISTANCE
[facility] provides services at a reduced cost to those who are determined to be eligible through [facility]’s financial assistance program. Information regarding [facility]’s financial assistance program can be found by going to [email or web address] or by calling [phone].

## 2024-10-16 NOTE — DISCHARGE NOTE NURSING/CASE MANAGEMENT/SOCIAL WORK - PATIENT PORTAL LINK FT
You can access the FollowMyHealth Patient Portal offered by [facility] by registering at the following website: [email or web address]. By joining [facility]’s FollowMyHealth portal, you will also be able to view your health information using other applications (apps) compatible with our system.

## 2024-10-16 NOTE — PROGRESS NOTE ADULT - SUBJECTIVE AND OBJECTIVE BOX
Orthopedic Progress Note    Patient is status post left total knee arthroplasty, POD # 1  Patient seen and examined at bedside.  Patient denies CP, SOB, dizziness, HA, N/V/D.  Patient reports pain controlled.    Vital Signs Last 24 Hrs  T(C): 36.9 (16 Oct 2024 04:00), Max: 37.1 (15 Oct 2024 20:45)  T(F): 98.5 (16 Oct 2024 04:00), Max: 98.7 (15 Oct 2024 20:45)  HR: 88 (16 Oct 2024 04:00) (80 - 97)  BP: 112/71 (16 Oct 2024 04:00) (112/71 - 149/80)  BP(mean): 106 (15 Oct 2024 18:00) (87 - 106)  RR: 18 (16 Oct 2024 04:00) (14 - 18)  SpO2: 98% (16 Oct 2024 04:00) (95% - 100%)    Parameters below as of 16 Oct 2024 04:00  Patient On (Oxygen Delivery Method): room air    Exam:  Gen: No acute distress  LLE: Aquacel clean, dry, and intact  Motor intact EHL/FHL/TA/GS  SILT in the distal extremity  + DP pulse  BLE: Calves soft and nontender

## 2024-10-22 ENCOUNTER — NON-APPOINTMENT (OUTPATIENT)
Age: 59
End: 2024-10-22

## 2024-10-26 RX ORDER — AMOXICILLIN AND CLAVULANATE POTASSIUM 500; 125 MG/1; MG/1
500-125 TABLET, FILM COATED ORAL
Qty: 10 | Refills: 0 | Status: ACTIVE | COMMUNITY
Start: 2024-10-26 | End: 1900-01-01

## 2024-10-28 ENCOUNTER — APPOINTMENT (OUTPATIENT)
Dept: ORTHOPEDIC SURGERY | Facility: CLINIC | Age: 59
End: 2024-10-28
Payer: COMMERCIAL

## 2024-10-28 VITALS — BODY MASS INDEX: 40.75 KG/M2 | HEIGHT: 63 IN | WEIGHT: 230 LBS

## 2024-10-28 DIAGNOSIS — Z96.652 PRESENCE OF LEFT ARTIFICIAL KNEE JOINT: ICD-10-CM

## 2024-10-28 PROCEDURE — 73562 X-RAY EXAM OF KNEE 3: CPT | Mod: LT

## 2024-10-28 PROCEDURE — 99024 POSTOP FOLLOW-UP VISIT: CPT

## 2024-11-19 ENCOUNTER — NON-APPOINTMENT (OUTPATIENT)
Age: 59
End: 2024-11-19

## 2025-01-10 ENCOUNTER — TRANSCRIPTION ENCOUNTER (OUTPATIENT)
Age: 60
End: 2025-01-10

## 2025-01-13 ENCOUNTER — NON-APPOINTMENT (OUTPATIENT)
Age: 60
End: 2025-01-13

## 2025-01-31 ENCOUNTER — APPOINTMENT (OUTPATIENT)
Dept: ORTHOPEDIC SURGERY | Facility: CLINIC | Age: 60
End: 2025-01-31

## 2025-02-09 NOTE — OCCUPATIONAL THERAPY INITIAL EVALUATION ADULT - WEIGHT-BEARING RESTRICTIONS: STAND/SIT, REHAB EVAL
PULMONARY MEDICINE INPATIENT FOLLOW-UP    REASON FOR FOLLOW-UP/CHIEF COMPLAINT: COPD    ASSESSMENT AND PLAN:    Very severe COPD with exacerbation: Continue all COPD inhalers, prednisone taper.   - baseline 10 mg pred daily  -Would recommend changing to oral prednisone at 40 mg daily with slow taper to her baseline dose over the next 2 weeks  - given her severity of COPD could cover with Azithromycin or Ohtuvayre.  Given her eosinophilia, could also consider Dupixent.  Will defer the addition of these agents to Dr. Murray as an outpatient.  - she will need follow up in the clinic.      2. Acute on Chronic hypoxic and hypercapneic resp failure: - 4 liters NC continuously.      History of KAMILAH colonization.     Left upper lobe nodule, on CT chest August 2023, but not present in June 2022.  Given the severity of her COPD, will defer to Dr. Murray to further follow-up of this left upper lobe nodule is required.    HISTORY OF PRESENT ILLNESS/SUBJECTIVE: Overall feels somewhat better than at admission.  Had significant worsening of her breathing yesterday from one of the providers caroline.  No cough or sputum production      REVIEW OF SYSTEMS    A 14 point comprehensive review of symptoms was negative except for what was discussed in the HPI.    Vital Last Value 24 Hour Range   Temperature 97.3 °F (36.3 °C) Temp  Min: 97.3 °F (36.3 °C)  Max: 98.6 °F (37 °C)   Pulse 82 Pulse  Min: 82  Max: 111   Respiratory Rate 18 Resp  Min: 18  Max: 18   Non-Invasive   Blood Pressure 110/64 (02/09/25 0459) BP  Min: 110/64  Max: 132/68   Arterial  Blood Pressure   No data recorded   Pulse Oximetry 97 % SpO2  Min: 97 %  Max: 99 %     O2 4 L/min     FiO2 40 %     No intake/output data recorded.  Wt Readings from Last 3 Encounters:   02/06/25 83.7 kg (184 lb 8.4 oz)   01/27/25 80.6 kg (177 lb 9.6 oz)   08/29/24 80.5 kg (177 lb 8 oz)        Physical Exam:   General Appearance:  Alert, appears stated age, no acute distress. Sitting in bed.    Head:  Normocephalic, without obvious abnormality, atraumatic.  Pupils equal.  No icterus.   Throat: oral mucosa, tongue, dentition poor, Mallampati 4 airway.  Neck:  Supple, symmetrical, trachea midline.  Lungs: Overall poor air movement, but better than 2 days ago.  No tachypnea.  No accessory muscle use.   Heart:  S1, S2.  Regular rate and rhythm.  No calf tenderness. No edema in the bilateral lower extremities.  Abdomen:  Soft, nontender.    Extremities:  No clubbing or arthritis. Pulses: +2  bilaterally, radial.   Neurologic:  Alert, oriented x 3.  Engaged.   Skin:  No rashes, palpable nodules, or jaundice.  Lymphatic:  No cervical or supraclavicular adenopathy.       Medications personally reviewed     LABS: Personally reviewed    Lab Results   Component Value Date    SODIUM 141 02/06/2025    POTASSIUM 4.4 02/07/2025    CHLORIDE 99 02/06/2025    CO2 39 (H) 02/06/2025    BUN 11 02/06/2025    CREATININE 0.70 02/06/2025    GLUCOSE 84 02/06/2025    MG 1.9 10/06/2023    WBC 6.3 02/06/2025    HGB 9.6 (L) 02/06/2025    HCT 34.2 (L) 02/06/2025     02/06/2025    PT 11.1 02/07/2024    INR 1.0 02/07/2024    PTT 27 12/23/2018    BILIRUBIN <0.2 (L) 02/06/2025    ALKPT 64 02/06/2025    AST 22 02/06/2025    GPT 15 02/06/2025       Lab Results   Component Value Date    APH 7.41 10/02/2023    APO2 36 (LL) 10/02/2023    ASAT 72 (L) 10/02/2023    FIO2 21 11/12/2018    APCO2 61 (HH) 10/02/2023    AHCO3 39 (H) 10/02/2023       IMAGING: None  Christ Augustin MD     LLE/weight-bearing as tolerated

## 2025-03-29 ENCOUNTER — NON-APPOINTMENT (OUTPATIENT)
Age: 60
End: 2025-03-29

## 2025-08-11 ENCOUNTER — NON-APPOINTMENT (OUTPATIENT)
Age: 60
End: 2025-08-11

## (undated) DEVICE — SOL IRR POUR H2O 1000ML

## (undated) DEVICE — TUBING SUCTION 20FT

## (undated) DEVICE — VENODYNE/SCD SLEEVE CALF MEDIUM

## (undated) DEVICE — DRAPE 3/4 SHEET W REINFORCEMENT 56X77"

## (undated) DEVICE — MAKO VIZADISC KNEE TRACKING KIT

## (undated) DEVICE — MAKO DRAPE KIT

## (undated) DEVICE — TOURNIQUET CUFF 34" DUAL PORT W PLC

## (undated) DEVICE — POSITIONER CARDIAC BUMP

## (undated) DEVICE — GLV 8 PROTEXIS (WHITE)

## (undated) DEVICE — SOL IRR POUR NS 0.9% 500ML

## (undated) DEVICE — HOOD T7 NON-PEELAWAY

## (undated) DEVICE — SPECIMEN CONTAINER 100ML

## (undated) DEVICE — SUT PDO 2 1/2 CIRCLE 40MM NDL 45CM

## (undated) DEVICE — ELCTR PLASMA BLADE X 3.0S WIDE TIP

## (undated) DEVICE — SUT POLYSORB 2-0 30" GS-21 UNDYED

## (undated) DEVICE — SYR LUER LOK 20CC

## (undated) DEVICE — DRSG DERMABOND 0.7ML

## (undated) DEVICE — SUT QUILL MONODERM 2-0 3/8 CIRCLE 45CM

## (undated) DEVICE — GLV 8.5 PROTEXIS (WHITE)

## (undated) DEVICE — POSITIONER FOAM EGG CRATE ULNAR 2PCS (PINK)

## (undated) DEVICE — DRSG AQUACEL 3.5 X 10"

## (undated) DEVICE — SAW BLADE STRYKER SAGITTAL DUAL CUT 64X35X.89MM

## (undated) DEVICE — WARMING BLANKET UPPER ADULT

## (undated) DEVICE — NDL HYPO SAFE 22G X 1.5" (BLACK)

## (undated) DEVICE — SUT QUILL PDO 0 45CM 36MM

## (undated) DEVICE — MAKO BLADE STANDARD

## (undated) DEVICE — PACK TOTAL KNEE (2 PACKS)

## (undated) DEVICE — POSITIONER FOAM HEADREST (PINK)

## (undated) DEVICE — WOUND IRR IRRISEPT W 0.5 CHG

## (undated) DEVICE — SUT POLYSORB 1 36" GS-21 UNDYED